# Patient Record
(demographics unavailable — no encounter records)

---

## 2019-11-15 NOTE — P.GSHP
History of Present Illness


H&P Date: 11/15/19


Chief Complaint: Left arm lipoma 2





This is a 30-year-old male who presents today for excision of left arm lipoma.  

Patient has a for similar lipoma near his left bicep and a 2 cm lipoma on the 

forearm.





Past Medical History


Past Medical History: Hypertension


Additional Past Medical History / Comment(s): STATES NEW DIAGNOSIS OF HTN- NO 

MEDS- STATES HE IS TRYING DIET AND EXERCISE ., HX GOUT, ASTHMA (CHILD), LIPOMAS 

LEFT ARM. , SCHEDULING SLEEP APNEA TESTING, BACK & KNEE PAIN.


History of Any Multi-Drug Resistant Organisms: None Reported


Past Surgical History: No Surgical Hx Reported


Past Anesthesia/Blood Transfusion Reactions: Motion Sickness


Additional Past Anesthesia/Blood Transfusion Reaction / Comment(s): NO 

ANESTHESIA HX.


Past Psychological History: No Psychological Hx Reported


Smoking Status: Former smoker


Past Alcohol Use History: Occasional


Additional Past Alcohol Use History / Comment(s): QUIT SMOKING 2014.  SMOKED 

LESS THAN 1/2 PPD.  SMOKED 7 YEARS.


Past Drug Use History: Marijuana


Additional Drug Use History / Comment(s): CURRENT MARIJUANA USE.





- Past Family History


  ** Mother


Family Medical History: No Reported History





Medications and Allergies


                                Home Medications











 Medication  Instructions  Recorded  Confirmed  Type


 


Ascorbic Acid [Vitamin C] 500 mg PO DAILY 11/13/19 11/15/19 History








                                    Allergies











Allergy/AdvReac Type Severity Reaction Status Date / Time


 


No Known Allergies Allergy   Verified 11/15/19 09:45














Surgical - Exam


                                   Vital Signs











Temp Pulse Resp BP Pulse Ox


 


 97.5 F L  68   18   136/85   99 


 


 11/15/19 09:50  11/15/19 09:50  11/15/19 09:50  11/15/19 09:50  11/15/19 09:50














- General


well developed, well nourished, no distress





- Eyes


PERRL





- ENT


normal pinna





- Neck


no masses





- Respiratory


normal expansion





- Cardiovascular


Rhythm: regular





- Abdomen


Abdomen: soft, non tender





- Integumentary





Or similar lipoma left upper arm on the medial aspect of bicep, 3 cm lipoma left

forearm





Assessment and Plan


Assessment: 





Left arm lipoma 2.  We'll perform excision.

## 2019-11-15 NOTE — P.OP
Date of Procedure: 11/15/19


Preoperative Diagnosis: 


Left arm lipomas 2


Postoperative Diagnosis: 


Left arm lipoma 2


Procedure(s) Performed: 


Excision of left arm lipoma 2


Anesthesia: MAC


Surgeon: Ralph Wallace


Estimated Blood Loss (ml): 5


Pathology: other (And lipomas)


Condition: stable


Disposition: PACU


Description of Procedure: 


Patient's placed on the operative table in the supine position.  He received IV 

sedation.  His left arm was prepped and draped usual sterile fashion.  The 

patient had a for similar lipoma located in the medial aspect of his left bicep.

 The skin was anesthetized 1% local Xylocaine.  Then the skin was incised using 

a 15 blade and then using blunt and sharp dissection with cautery the lipoma was

excised.  The skin was closed interrupted 3-0 Monocryl suture.  Dermabond was 

applied.  Next the left forearm lipoma was anesthetized 1% local Xylocaine.  

Skin was incised and then using blunt and sharp dissection with cautery the 

lipoma was removed.  The lipoma measured approximately 2 cm in diameter.  This 

was sent to pathology.  The skin was closed interrupted 3-0 Monocryl suture.  

Dermabond was applied.  Patient top she will well and was sent to recovery room 

in stable condition.

## 2019-11-20 NOTE — CDI
Date:  11.20.19



CDS/ Name:  Zulema العلي

Phone:  If any questions, call Breana King   at  [phone]



Patient Name:  Ck Tinoco

Acct #:  [id number]

Adm Date:  11.15.19

Dis Date:  11.15.19



Attention:  The [facility] coding staff appreciate your assistance in clarifying 
documentation.  Please respond to the clarification below the line at the bottom
and electronically sign.  The [facility] Coding Staff will review the response and 
follow up if needed.  Please note:  Queries are made part of the legal health 
record.  If you have any questions, please contact the .



Dear Dr. Wallace



Please document the size of the lipoma you removed from the patients left bicep.



Thank you for your kind consideration.



_________________________________________________________________________

4 cm

MTDD